# Patient Record
Sex: MALE | Race: WHITE | NOT HISPANIC OR LATINO | URBAN - METROPOLITAN AREA
[De-identification: names, ages, dates, MRNs, and addresses within clinical notes are randomized per-mention and may not be internally consistent; named-entity substitution may affect disease eponyms.]

---

## 2018-11-21 ENCOUNTER — OFFICE VISIT (OUTPATIENT)
Dept: URBAN - METROPOLITAN AREA CLINIC 23 | Facility: CLINIC | Age: 73
End: 2018-11-21
Payer: MEDICARE

## 2018-11-21 DIAGNOSIS — E11.9 TYPE 2 DIABETES MELLITUS W/O COMPLICATION: Primary | ICD-10-CM

## 2018-11-21 DIAGNOSIS — H40.022: ICD-10-CM

## 2018-11-21 DIAGNOSIS — H35.372 PUCKERING OF MACULA, LEFT EYE: ICD-10-CM

## 2018-11-21 PROCEDURE — 92134 CPTRZ OPH DX IMG PST SGM RTA: CPT | Performed by: OPTOMETRIST

## 2018-11-21 PROCEDURE — 92014 COMPRE OPH EXAM EST PT 1/>: CPT | Performed by: OPTOMETRIST

## 2018-11-21 ASSESSMENT — INTRAOCULAR PRESSURE
OS: 20
OD: 20

## 2018-11-21 ASSESSMENT — KERATOMETRY
OS: 41.38
OD: 41.50

## 2018-11-21 NOTE — IMPRESSION/PLAN
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 Plan: Discussed findings. Peripapillary atrophy present OU making measurement of RNFL thickness difficult. Recommend consult with Dr. Angel Huddleston.

## 2018-11-21 NOTE — IMPRESSION/PLAN
Impression: Puckering of macula, left eye: H35.372. Plan: Discussed mild macular changes. No treatment necessary.

## 2018-11-21 NOTE — IMPRESSION/PLAN
Impression: Type 2 diabetes mellitus w/o complication: Q32.0. Plan: Diabetes type II: no background retinopathy, no signs of neovascularization noted. Discussed ocular and systemic benefits of blood sugar control.

## 2018-12-20 ENCOUNTER — OFFICE VISIT (OUTPATIENT)
Dept: URBAN - METROPOLITAN AREA CLINIC 29 | Facility: CLINIC | Age: 73
End: 2018-12-20
Payer: MEDICARE

## 2018-12-20 DIAGNOSIS — H40.1324 PIGMENTARY GLAUCOMA, LEFT EYE, INDETERMINATE STAGE: Primary | ICD-10-CM

## 2018-12-20 PROCEDURE — 76514 ECHO EXAM OF EYE THICKNESS: CPT | Performed by: OPHTHALMOLOGY

## 2018-12-20 PROCEDURE — 92004 COMPRE OPH EXAM NEW PT 1/>: CPT | Performed by: OPHTHALMOLOGY

## 2018-12-20 PROCEDURE — 92014 COMPRE OPH EXAM EST PT 1/>: CPT | Performed by: OPHTHALMOLOGY

## 2018-12-20 RX ORDER — LATANOPROST 50 UG/ML
0.005 % SOLUTION OPHTHALMIC
Qty: 1 | Refills: 10 | Status: INACTIVE
Start: 2018-12-20 | End: 2019-01-22

## 2018-12-20 ASSESSMENT — INTRAOCULAR PRESSURE
OD: 19
OS: 18

## 2018-12-20 NOTE — IMPRESSION/PLAN
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 OS.
-IOP ou borderline -
ONH large CD ratio ou - average CCT's ou - Plan: Discussed diagnosis, explained and understood by patient. Discussed IOP/ONH/Glaucoma management and risks. start latanoprost OS qhs. no gtts OD for now. Discussed side-effects of glaucoma meds - Will continue to monitor condition and symptoms.

## 2019-01-21 ENCOUNTER — TESTING ONLY (OUTPATIENT)
Dept: URBAN - METROPOLITAN AREA CLINIC 29 | Facility: CLINIC | Age: 74
End: 2019-01-21
Payer: MEDICARE

## 2019-01-21 PROCEDURE — 92083 EXTENDED VISUAL FIELD XM: CPT | Performed by: OPHTHALMOLOGY

## 2019-01-22 ENCOUNTER — OFFICE VISIT (OUTPATIENT)
Dept: URBAN - METROPOLITAN AREA CLINIC 29 | Facility: CLINIC | Age: 74
End: 2019-01-22
Payer: MEDICARE

## 2019-01-22 PROCEDURE — 99213 OFFICE O/P EST LOW 20 MIN: CPT | Performed by: OPHTHALMOLOGY

## 2019-01-22 RX ORDER — LATANOPROST 50 UG/ML
0.005 % SOLUTION OPHTHALMIC
Qty: 1 | Refills: 11 | Status: INACTIVE
Start: 2019-01-22 | End: 2019-10-21

## 2019-01-22 ASSESSMENT — INTRAOCULAR PRESSURE
OS: 15
OD: 18

## 2019-06-11 ENCOUNTER — OFFICE VISIT (OUTPATIENT)
Dept: URBAN - METROPOLITAN AREA CLINIC 29 | Facility: CLINIC | Age: 74
End: 2019-06-11
Payer: MEDICARE

## 2019-06-11 PROCEDURE — 99213 OFFICE O/P EST LOW 20 MIN: CPT | Performed by: OPHTHALMOLOGY

## 2019-06-11 ASSESSMENT — INTRAOCULAR PRESSURE
OS: 14
OD: 15

## 2019-10-18 ENCOUNTER — OFFICE VISIT (OUTPATIENT)
Dept: URBAN - METROPOLITAN AREA CLINIC 29 | Facility: CLINIC | Age: 74
End: 2019-10-18
Payer: MEDICARE

## 2019-10-18 DIAGNOSIS — H40.1111 PRIMARY OPEN-ANGLE GLAUCOMA, RIGHT EYE, MILD STAGE: Primary | ICD-10-CM

## 2019-10-18 DIAGNOSIS — H40.1322 PIGMENTARY GLAUCOMA, LEFT EYE, MODERATE STAGE: ICD-10-CM

## 2019-10-18 PROCEDURE — 99213 OFFICE O/P EST LOW 20 MIN: CPT | Performed by: OPHTHALMOLOGY

## 2019-10-18 ASSESSMENT — INTRAOCULAR PRESSURE
OD: 15
OS: 15

## 2019-10-18 NOTE — IMPRESSION/PLAN
Impression: Pigmentary glaucoma, left eye, moderate stage: W54.0640. CCT average OU Plan: Discussed diagnosis, explained and understood by patient. Discussed IOP/ONH/Glaucoma management and risks. Continue latanoprost qhs ou. Will continue to monitor condition and symptoms.

## 2020-03-02 ENCOUNTER — TESTING ONLY (OUTPATIENT)
Dept: URBAN - METROPOLITAN AREA CLINIC 29 | Facility: CLINIC | Age: 75
End: 2020-03-02
Payer: MEDICARE

## 2020-03-02 PROCEDURE — 92133 CPTRZD OPH DX IMG PST SGM ON: CPT | Performed by: OPHTHALMOLOGY

## 2020-03-02 PROCEDURE — 92083 EXTENDED VISUAL FIELD XM: CPT | Performed by: OPHTHALMOLOGY

## 2020-03-03 ENCOUNTER — OFFICE VISIT (OUTPATIENT)
Dept: URBAN - METROPOLITAN AREA CLINIC 29 | Facility: CLINIC | Age: 75
End: 2020-03-03
Payer: MEDICARE

## 2020-03-03 DIAGNOSIS — H40.1334 PIGMENTARY GLAUCOMA, BILATERAL, INDETERMINATE STAGE: Primary | ICD-10-CM

## 2020-03-03 PROCEDURE — 92133 CPTRZD OPH DX IMG PST SGM ON: CPT | Performed by: OPHTHALMOLOGY

## 2020-03-03 PROCEDURE — 92083 EXTENDED VISUAL FIELD XM: CPT | Performed by: OPHTHALMOLOGY

## 2020-03-03 PROCEDURE — 99214 OFFICE O/P EST MOD 30 MIN: CPT | Performed by: OPHTHALMOLOGY

## 2020-03-03 ASSESSMENT — INTRAOCULAR PRESSURE
OS: 17
OD: 18

## 2020-03-03 NOTE — IMPRESSION/PLAN
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 ONH - Myopic disc IOP stable OU Plan: Discussed diagnosis, explained and understood by patient. Discussed IOP/ONH/Glaucoma management and risks. Discussed a reviewed VF and OCT with patient, condition remains stable. Continue latanoprost qhs ou. Will continue to monitor condition and symptoms. We will continue to monitor .

## 2020-07-07 ENCOUNTER — OFFICE VISIT (OUTPATIENT)
Dept: URBAN - METROPOLITAN AREA CLINIC 23 | Facility: CLINIC | Age: 75
End: 2020-07-07
Payer: MEDICARE

## 2020-07-07 DIAGNOSIS — H52.223 REGULAR ASTIGMATISM, BILATERAL: ICD-10-CM

## 2020-07-07 PROCEDURE — 99213 OFFICE O/P EST LOW 20 MIN: CPT | Performed by: OPTOMETRIST

## 2020-07-07 ASSESSMENT — INTRAOCULAR PRESSURE
OD: 21
OS: 19

## 2020-07-07 ASSESSMENT — VISUAL ACUITY
OD: 20/20
OS: 20/20

## 2020-07-07 NOTE — IMPRESSION/PLAN
Impression: Pigmentary glaucoma, left eye, moderate stage: H40.1322 OS. Plan: continue drops. Discussed slight elevation in IOP. He is on prednisone now for PMR.

## 2020-07-07 NOTE — IMPRESSION/PLAN
Impression: Regular astigmatism, bilateral: H52.223. Plan: Discussed findings. New Rx given, distance only.

## 2022-09-14 NOTE — IMPRESSION/PLAN
Impression: Pigmentary glaucoma, left eye, moderate stage: J94.9194. CCT average OU --ONH stable OU
IOP doing well ou - Plan: Discussed diagnosis, explained and understood by patient. Discussed IOP/ONH/Glaucoma management and risks. Continue latanoprost qhs ou. Will continue to monitor condition and symptoms.
Impression: Primary open-angle glaucoma, right eye, mild stage: H40.1111. OD.  Plan: see note #1
None

## 2023-01-21 NOTE — IMPRESSION/PLAN
Impression: Pigmentary glaucoma, left eye, moderate stage: Y11.1770. CCT average OU ONH stable OU
IOP stable OS, elevated OD Plan: Discussed diagnosis, explained and understood by patient. Discussed IOP/ONH/Glaucoma management and risks. Reviewed VF. Continue latanoprost qhs os and start qhs od. Will continue to monitor condition and symptoms.
Impression: Primary open-angle glaucoma, right eye, mild stage: H40.1111.  Plan: see note #1
Home